# Patient Record
(demographics unavailable — no encounter records)

---

## 2024-11-01 NOTE — ASSESSMENT
[FreeTextEntry1] : 1. NIFTP. s/p total thyroidectomy. Post-surgical hypothyroidism - Synthroid 112 mcg + cytomel 5 mcg - advised to f/u with her rheumatologist and neurologist  2. Obesity - continue Lomaira 8 mg ac breakfast and monitor for insomnia - topamax 25 mg bid. R+B - Zepbound 15 mg qw - dietary changes reviewed. add exercises as tolerated  RTC 3-4 months, or sooner prn

## 2024-11-01 NOTE — HISTORY OF PRESENT ILLNESS
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 took steroids, skin presentation resolved. drug rection was r/o. was tested negative for leukemia, had a CT chest/abd/pelvis done which was negative for malignancy seeing rheum has been having brain fog; was "forgetting to take her synthroid". cravings at night has not seen a neurologist zepbound approved, but having difficulties finding her dose. was on 73.5 mg, then back on 5 mg and is ready to resume 7.5 mg  staying on Synthroid 112 mcg,  cytomel 5 mcg, Lomaira 8 mg ac breakfast, Topamax 25 mg qd labs from 06/27/24 reviewed- normal CMP, neg HIV. no recent tft's  *** Apr 18, 2024 ***  zepbound was denied stayed on Wegovy 2.4 mg qw, Synthroid 112 mcg,  cytomel 5 mcg, Lomaira 8 mg ac breakfast,  stopped topamax (thought  that did not needed it anymore)- now thinks it was helping her  insomnia is better when switched to Lomaira plateaued with her weight under stress b/o father illness. feels that anxiety has been worse, but has not seen a psychiatrist. stress eating labs from rheumatologist (4/4/24) - creat- 0.62, calcium- 9.1, 25D- 36, TSH- 1.03, B12- 750, folate- 16.8  *** Jan 11, 2024 ***  feels well. lost more weight (total loss ~ 40 lbs) states that her cravings are back. with insomnia (difficulties falling asleep). does not think her medications affect this- "always had troubles sleeping" . mood is good staying on Synthroid 112 mcg,  cytomel 5 mcg, phentermine 15 mg qd, topamax 50 mg qd, Wegovy 2.4 mg qw labs from 1/4/24- TSH- 2.4  *** Jul 14, 2023 ***  feels well overall . good energy, no depression. BP has been better controlled seeing rheum for RA taking Synthroid 112 mcg,  cytomel 5 mcg, phentermine 15 mg qd, topamax 50 mg qd, Wegovy 2.4 mg qw no recent labs  *** May 11, 2023 ***  remains on Synthroid 125 mcg, cytomel 5 mcg  daily, phentermine  37.5 mg qd,  topamax 50 mg qd, Wegovy 2.4 mg qw lost 13 lbs since the last visit tolerates meds well. some insomnia recently labs done last week at rheum office- TSH- 0.068, 25D- 25.6, a1c- 5.3, LFT's - normal  *** Jan 06, 2023 ***  doing ok, has been on steroids x past 3 months for asthma/ recurrent URI. Now again on Medrol pack tapering dose.  seeing an allergist and pulmonary. gained more weight while on steroids  remains on Synthroid 125 mcg, cytomel 5 mcg  daily, phentermine  37.5 mg qd + topamax 50 mg qd no recent labs  *** Sep 15, 2022 ***  feels fine. regained some weight- was on steroids for eczema flare up on  Synthroid 125 mcg, cytomel 5 mcg  daily, phentermine 15 mg qd + topamax 50 mg qd   *** May 12, 2022 ***  doing well ok but with some cold intolerance and fatigue/ joint pain. Seeing rheumatologist for her RA on  Synthroid 125 mcg, cytomel 5 mcg  daily, phentermine 15 mg qd + topamax 50 mg qd lost more weight  *** Jan 27, 2022 ***  feels better overall. staying on a low carb diet. lost a bit more weight, but plateaued  post-op scar has flattened out- did not have to see a plastic sx taking Synthroid 125 mcg, cytomel 5 mcg  daily, phentermine 15 mg qd labs from 1/20/21- TSH- 0.29 , no peripheral levels  Thyr US (1/6/22)- s/p total tx. no susp findings  *** Sep 30, 2021 ***  on Synthroid 125 mcg + cytomel 5 mcg 2 tabs daily, phentermine 15 mg qd watching her diet, follows keto diet. lost 20 lbs labs from 8/21- TSH- 0.182, FT4- 1.3 saw Dr. Gray last month- still pulling sensation into the left shoulder post sx. planned for an MRI with new hot flashes and increased sweating feels much better since adding cytomel. brain fog has resolved  *** Apr 08, 2021 ***  missed f/u appt. had a PNA in 01/21. recovered well. received covid vaccine on Synthroid 125 mcg + cytomel 5 mcg feels much better since adding cytomel. anxiety and brain fog improved a lot. still struggles with weight loss despite rejoining [employer] and adding more exercise. BP is under control with telmisartan-HCTZ Thyr  (12/10/20)- s/p total tx. no susp MARIA G  *** Sep 24, 2020 ***  on synthroid 150 mcg difficulties losing weight, more anxiety recently and worsening depression. with occasional muscle cramps. Still with a "brain fog" post-op scar is more sensitive   *** Jul 23, 2020 ***  saw rheum for a RA flare up. on a tapering dose of prednisone, currently 10mg qd. Also, started on Rinvoq joint pain and myalgia improved a lot. struggling with the weight loss and noticed a"brain fog" on synthroid 150 mcg qd  *** Jun 19, 2020 ***  s/p total tx (6/5/20). Path: right lobe- 1.0cm NIFTP, margins negative, left lobe- nodular hyperplasia with focal oncocytic  d/c on Synthroid 150 mcg. Also, on calcium 1000mg bid  HPI: She was diagnosed with "possible goiter" about 15 years ago on a clinical exam, but she never proceeded with the evaluation. However, over the past time, she noticed worsening of her chronic cough, food "getting stuck" in her throat. She reports seeing GI with a presumably negative work up, but does not recall having an EGD done. Denies history of neck surgery or radiation exposure to the neck area other than radiologic studies.  Family history is significant for thyroid cancer in her 1st cousin. Patient denies symptoms of hypothyroidism or hyperthyroidism.  Labs: TSH- 0.364, FT4- 1.47 Thyroid US: Enlarged thyroid. RL- 7.9 cm, LL- 6.5 cm. Multiple bilateral nodules, including domiant RUP complex 2.6x1.3x2.1, RMP hypo solid 1.9x1.1x1.9, RMP spongi 2.3x1.2x1.7 and spongi RLP 1.9x1.9x2.5; LMP hypo 1.4x0.8x1.5, complex LLP 1.8x0.6x1.0 and iso solid LLP 1.3x1.0x1.4

## 2025-02-28 NOTE — ASSESSMENT
[FreeTextEntry1] : 1. NIFTP. s/p total thyroidectomy. Post-surgical hypothyroidism - Synthroid 112 mcg + cytomel 5 mcg - advised to f/u with her rheumatologist and neurologist  2. Obesity - continue Lomaira 8 mg ac breakfast and monitor for insomnia - keep off topamax - Zepbound 15 mg qw - dietary changes reviewed. add exercises as tolerated  RTC 3-4 months, or sooner prn

## 2025-02-28 NOTE — HISTORY OF PRESENT ILLNESS
[FreeTextEntry1] : 47 year female  f/u for postsurgical hypothyroidism and weight management  *** Feb 28, 2025 ***  feels ok, except for a fatigue lost more weight since the last visit - happy with her progress. 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 took steroids, skin presentation resolved. drug rection was r/o. was tested negative for leukemia, had a CT chest/abd/pelvis done which was negative for malignancy seeing rheum has been having brain fog; was "forgetting to take her synthroid". cravings at night has not seen a neurologist zepbound approved, but having difficulties finding her dose. was on 73.5 mg, then back on 5 mg and is ready to resume 7.5 mg  staying on Synthroid 112 mcg,  cytomel 5 mcg, Lomaira 8 mg ac breakfast, Topamax 25 mg qd labs from 06/27/24 reviewed- normal CMP, neg HIV. no recent tft's  *** Apr 18, 2024 ***  zepbound was denied stayed on Wegovy 2.4 mg qw, Synthroid 112 mcg,  cytomel 5 mcg, Lomaira 8 mg ac breakfast,  stopped topamax (thought  that did not needed it anymore)- now thinks it was helping her  insomnia is better when switched to Lomaira plateaued with her weight under stress b/o father illness. feels that anxiety has been worse, but has not seen a psychiatrist. stress eating labs from rheumatologist (4/4/24) - creat- 0.62, calcium- 9.1, 25D- 36, TSH- 1.03, B12- 750, folate- 16.8  *** Jan 11, 2024 ***  feels well. lost more weight (total loss ~ 40 lbs) states that her cravings are back. with insomnia (difficulties falling asleep). does not think her medications affect this- "always had troubles sleeping" . mood is good staying on Synthroid 112 mcg,  cytomel 5 mcg, phentermine 15 mg qd, topamax 50 mg qd, Wegovy 2.4 mg qw labs from 1/4/24- TSH- 2.4  *** Jul 14, 2023 ***  feels well overall . good energy, no depression. BP has been better controlled seeing rheum for RA taking Synthroid 112 mcg,  cytomel 5 mcg, phentermine 15 mg qd, topamax 50 mg qd, Wegovy 2.4 mg qw no recent labs  *** May 11, 2023 ***  remains on Synthroid 125 mcg, cytomel 5 mcg  daily, phentermine  37.5 mg qd,  topamax 50 mg qd, Wegovy 2.4 mg qw lost 13 lbs since the last visit tolerates meds well. some insomnia recently labs done last week at rheum office- TSH- 0.068, 25D- 25.6, a1c- 5.3, LFT's - normal  *** Jan 06, 2023 ***  doing ok, has been on steroids x past 3 months for asthma/ recurrent URI. Now again on Medrol pack tapering dose.  seeing an allergist and pulmonary. gained more weight while on steroids  remains on Synthroid 125 mcg, cytomel 5 mcg  daily, phentermine  37.5 mg qd + topamax 50 mg qd no recent labs  *** Sep 15, 2022 ***  feels fine. regained some weight- was on steroids for eczema flare up on  Synthroid 125 mcg, cytomel 5 mcg  daily, phentermine 15 mg qd + topamax 50 mg qd   *** May 12, 2022 ***  doing well ok but with some cold intolerance and fatigue/ joint pain. Seeing rheumatologist for her RA on  Synthroid 125 mcg, cytomel 5 mcg  daily, phentermine 15 mg qd + topamax 50 mg qd lost more weight  *** Jan 27, 2022 ***  feels better overall. staying on a low carb diet. lost a bit more weight, but plateaued  post-op scar has flattened out- did not have to see a plastic sx taking Synthroid 125 mcg, cytomel 5 mcg  daily, phentermine 15 mg qd labs from 1/20/21- TSH- 0.29 , no peripheral levels  Thyr US (1/6/22)- s/p total tx. no susp findings  *** Sep 30, 2021 ***  on Synthroid 125 mcg + cytomel 5 mcg 2 tabs daily, phentermine 15 mg qd watching her diet, follows keto diet. lost 20 lbs labs from 8/21- TSH- 0.182, FT4- 1.3 saw Dr. Gray last month- still pulling sensation into the left shoulder post sx. planned for an MRI with new hot flashes and increased sweating feels much better since adding cytomel. brain fog has resolved  *** Apr 08, 2021 ***  missed f/u appt. had a PNA in 01/21. recovered well. received covid vaccine on Synthroid 125 mcg + cytomel 5 mcg feels much better since adding cytomel. anxiety and brain fog improved a lot. still struggles with weight loss despite rejoining [employer] and adding more exercise. BP is under control with telmisartan-HCTZ Thyr US (12/10/20)- s/p total tx. no susp MARIA G  *** Sep 24, 2020 ***  on synthroid 150 mcg difficulties losing weight, more anxiety recently and worsening depression. with occasional muscle cramps. Still with a "brain fog" post-op scar is more sensitive   *** Jul 23, 2020 ***  saw rheum for a RA flare up. on a tapering dose of prednisone, currently 10mg qd. Also, started on Rinvoq joint pain and myalgia improved a lot. struggling with the weight loss and noticed a"brain fog" on synthroid 150 mcg qd  *** Jun 19, 2020 ***  s/p total tx (6/5/20). Path: right lobe- 1.0cm NIFTP, margins negative, left lobe- nodular hyperplasia with focal oncocytic  d/c on Synthroid 150 mcg. Also, on calcium 1000mg bid  HPI: She was diagnosed with "possible goiter" about 15 years ago on a clinical exam, but she never proceeded with the evaluation. However, over the past time, she noticed worsening of her chronic cough, food "getting stuck" in her throat. She reports seeing GI with a presumably negative work up, but does not recall having an EGD done. Denies history of neck surgery or radiation exposure to the neck area other than radiologic studies.  Family history is significant for thyroid cancer in her 1st cousin. Patient denies symptoms of hypothyroidism or hyperthyroidism.  Labs: TSH- 0.364, FT4- 1.47 Thyroid US: Enlarged thyroid. RL- 7.9 cm, LL- 6.5 cm. Multiple bilateral nodules, including domiant RUP complex 2.6x1.3x2.1, RMP hypo solid 1.9x1.1x1.9, RMP spongi 2.3x1.2x1.7 and spongi RLP 1.9x1.9x2.5; LMP hypo 1.4x0.8x1.5, complex LLP 1.8x0.6x1.0 and iso solid LLP 1.3x1.0x1.4

## 2025-06-20 NOTE — HISTORY OF PRESENT ILLNESS
[FreeTextEntry1] : 47 year female  f/u for postsurgical hypothyroidism and weight management  *** Jun 20, 2025 ***  cycle is less regular, skipping 2 months at the time. + arthralgia no hot flashes. never on OCP's b/o h/o HTN does not know when her mother became menopausal ( had a hysterectomy) remains on  Synthroid 112 mcg,  cytomel 5 mcg, Lomaira 8 mg ac breakfast, Zepbound 15 mg qw stopped taking her BP meds ( was on Micarddis-HCT, but "did not like the water pill part, b/o frequent urination at work")  *** Feb 28, 2025 ***  feels ok, except for a fatigue lost more weight since the last visit - happy with her progress. 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 took steroids, skin presentation resolved. drug rection was r/o. was tested negative for leukemia, had a CT chest/abd/pelvis done which was negative for malignancy seeing rheum has been having brain fog; was "forgetting to take her synthroid". cravings at night has not seen a neurologist zepbound approved, but having difficulties finding her dose. was on 73.5 mg, then back on 5 mg and is ready to resume 7.5 mg  staying on Synthroid 112 mcg,  cytomel 5 mcg, Lomaira 8 mg ac breakfast, Topamax 25 mg qd labs from 06/27/24 reviewed- normal CMP, neg HIV. no recent tft's  *** Apr 18, 2024 ***  zepbound was denied stayed on Wegovy 2.4 mg qw, Synthroid 112 mcg,  cytomel 5 mcg, Lomaira 8 mg ac breakfast,  stopped topamax (thought  that did not needed it anymore)- now thinks it was helping her  insomnia is better when switched to Lomaira plateaued with her weight under stress b/o father illness. feels that anxiety has been worse, but has not seen a psychiatrist. stress eating labs from rheumatologist (4/4/24) - creat- 0.62, calcium- 9.1, 25D- 36, TSH- 1.03, B12- 750, folate- 16.8  *** Jan 11, 2024 ***  feels well. lost more weight (total loss ~ 40 lbs) states that her cravings are back. with insomnia (difficulties falling asleep). does not think her medications affect this- "always had troubles sleeping" . mood is good staying on Synthroid 112 mcg,  cytomel 5 mcg, phentermine 15 mg qd, topamax 50 mg qd, Wegovy 2.4 mg qw labs from 1/4/24- TSH- 2.4  *** Jul 14, 2023 ***  feels well overall . good energy, no depression. BP has been better controlled seeing rheum for RA taking Synthroid 112 mcg,  cytomel 5 mcg, phentermine 15 mg qd, topamax 50 mg qd, Wegovy 2.4 mg qw no recent labs  *** May 11, 2023 ***  remains on Synthroid 125 mcg, cytomel 5 mcg  daily, phentermine  37.5 mg qd,  topamax 50 mg qd, Wegovy 2.4 mg qw lost 13 lbs since the last visit tolerates meds well. some insomnia recently labs done last week at rheum office- TSH- 0.068, 25D- 25.6, a1c- 5.3, LFT's - normal  *** Jan 06, 2023 ***  doing ok, has been on steroids x past 3 months for asthma/ recurrent URI. Now again on Medrol pack tapering dose.  seeing an allergist and pulmonary. gained more weight while on steroids  remains on Synthroid 125 mcg, cytomel 5 mcg  daily, phentermine  37.5 mg qd + topamax 50 mg qd no recent labs  *** Sep 15, 2022 ***  feels fine. regained some weight- was on steroids for eczema flare up on  Synthroid 125 mcg, cytomel 5 mcg  daily, phentermine 15 mg qd + topamax 50 mg qd   *** May 12, 2022 ***  doing well ok but with some cold intolerance and fatigue/ joint pain. Seeing rheumatologist for her RA on  Synthroid 125 mcg, cytomel 5 mcg  daily, phentermine 15 mg qd + topamax 50 mg qd lost more weight  *** Jan 27, 2022 ***  feels better overall. staying on a low carb diet. lost a bit more weight, but plateaued  post-op scar has flattened out- did not have to see a plastic sx taking Synthroid 125 mcg, cytomel 5 mcg  daily, phentermine 15 mg qd labs from 1/20/21- TSH- 0.29 , no peripheral levels  Thyr US (1/6/22)- s/p total tx. no susp findings  *** Sep 30, 2021 ***  on Synthroid 125 mcg + cytomel 5 mcg 2 tabs daily, phentermine 15 mg qd watching her diet, follows keto diet. lost 20 lbs labs from 8/21- TSH- 0.182, FT4- 1.3 saw Dr. Gray last month- still pulling sensation into the left shoulder post sx. planned for an MRI with new hot flashes and increased sweating feels much better since adding cytomel. brain fog has resolved  *** Apr 08, 2021 ***  missed f/u appt. had a PNA in 01/21. recovered well. received covid vaccine on Synthroid 125 mcg + cytomel 5 mcg feels much better since adding cytomel. anxiety and brain fog improved a lot. still struggles with weight loss despite rejoining [employer] and adding more exercise. BP is under control with telmisartan-HCTZ Thyr US (12/10/20)- s/p total tx. no susp MARIA G  *** Sep 24, 2020 ***  on synthroid 150 mcg difficulties losing weight, more anxiety recently and worsening depression. with occasional muscle cramps. Still with a "brain fog" post-op scar is more sensitive   *** Jul 23, 2020 ***  saw rheum for a RA flare up. on a tapering dose of prednisone, currently 10mg qd. Also, started on Rinvoq joint pain and myalgia improved a lot. struggling with the weight loss and noticed a"brain fog" on synthroid 150 mcg qd  *** Jun 19, 2020 ***  s/p total tx (6/5/20). Path: right lobe- 1.0cm NIFTP, margins negative, left lobe- nodular hyperplasia with focal oncocytic  d/c on Synthroid 150 mcg. Also, on calcium 1000mg bid  HPI: She was diagnosed with "possible goiter" about 15 years ago on a clinical exam, but she never proceeded with the evaluation. However, over the past time, she noticed worsening of her chronic cough, food "getting stuck" in her throat. She reports seeing GI with a presumably negative work up, but does not recall having an EGD done. Denies history of neck surgery or radiation exposure to the neck area other than radiologic studies.  Family history is significant for thyroid cancer in her 1st cousin. Patient denies symptoms of hypothyroidism or hyperthyroidism.  Labs: TSH- 0.364, FT4- 1.47 Thyroid US: Enlarged thyroid. RL- 7.9 cm, LL- 6.5 cm. Multiple bilateral nodules, including domiant RUP complex 2.6x1.3x2.1, RMP hypo solid 1.9x1.1x1.9, RMP spongi 2.3x1.2x1.7 and spongi RLP 1.9x1.9x2.5; LMP hypo 1.4x0.8x1.5, complex LLP 1.8x0.6x1.0 and iso solid LLP 1.3x1.0x1.4
